# Patient Record
Sex: FEMALE | Race: WHITE | NOT HISPANIC OR LATINO | URBAN - METROPOLITAN AREA
[De-identification: names, ages, dates, MRNs, and addresses within clinical notes are randomized per-mention and may not be internally consistent; named-entity substitution may affect disease eponyms.]

---

## 2023-04-18 ENCOUNTER — OFFICE VISIT (OUTPATIENT)
Dept: ALLERGY | Facility: CLINIC | Age: 23
End: 2023-04-18
Payer: MEDICAID

## 2023-04-18 VITALS — WEIGHT: 125.81 LBS | TEMPERATURE: 98 F | HEART RATE: 89 BPM

## 2023-04-18 DIAGNOSIS — R06.89 DYSPNEA AND RESPIRATORY ABNORMALITIES: ICD-10-CM

## 2023-04-18 DIAGNOSIS — L20.84 INTRINSIC ATOPIC DERMATITIS: Primary | ICD-10-CM

## 2023-04-18 DIAGNOSIS — Z87.09 HISTORY OF ASTHMA: ICD-10-CM

## 2023-04-18 DIAGNOSIS — R06.00 DYSPNEA AND RESPIRATORY ABNORMALITIES: ICD-10-CM

## 2023-04-18 PROCEDURE — 99205 PR OFFICE/OUTPT VISIT, NEW, LEVL V, 60-74 MIN: ICD-10-PCS | Mod: S$GLB,,, | Performed by: ALLERGY & IMMUNOLOGY

## 2023-04-18 PROCEDURE — 1159F MED LIST DOCD IN RCRD: CPT | Mod: CPTII,S$GLB,, | Performed by: ALLERGY & IMMUNOLOGY

## 2023-04-18 PROCEDURE — 1160F RVW MEDS BY RX/DR IN RCRD: CPT | Mod: CPTII,S$GLB,, | Performed by: ALLERGY & IMMUNOLOGY

## 2023-04-18 PROCEDURE — 1159F PR MEDICATION LIST DOCUMENTED IN MEDICAL RECORD: ICD-10-PCS | Mod: CPTII,S$GLB,, | Performed by: ALLERGY & IMMUNOLOGY

## 2023-04-18 PROCEDURE — 99205 OFFICE O/P NEW HI 60 MIN: CPT | Mod: S$GLB,,, | Performed by: ALLERGY & IMMUNOLOGY

## 2023-04-18 PROCEDURE — 1160F PR REVIEW ALL MEDS BY PRESCRIBER/CLIN PHARMACIST DOCUMENTED: ICD-10-PCS | Mod: CPTII,S$GLB,, | Performed by: ALLERGY & IMMUNOLOGY

## 2023-04-18 RX ORDER — CLOTRIMAZOLE AND BETAMETHASONE DIPROPIONATE 10; .64 MG/G; MG/G
CREAM TOPICAL 2 TIMES DAILY
Qty: 45 G | Refills: 3 | Status: SHIPPED | OUTPATIENT
Start: 2023-04-18

## 2023-04-18 RX ORDER — FLUTICASONE PROPIONATE 50 MCG
SPRAY, SUSPENSION (ML) NASAL
COMMUNITY
Start: 2022-12-22

## 2023-04-18 RX ORDER — HYDROXYZINE PAMOATE 50 MG/1
100 CAPSULE ORAL 2 TIMES DAILY
COMMUNITY
Start: 2023-04-01

## 2023-04-18 RX ORDER — AZELASTINE 1 MG/ML
SPRAY, METERED NASAL
COMMUNITY
Start: 2022-12-28

## 2023-04-18 RX ORDER — PREDNISONE 20 MG/1
TABLET ORAL
Qty: 15 TABLET | Refills: 0 | Status: SHIPPED | OUTPATIENT
Start: 2023-04-18 | End: 2023-05-08

## 2023-04-18 RX ORDER — ALBUTEROL SULFATE 90 UG/1
2 AEROSOL, METERED RESPIRATORY (INHALATION) EVERY 6 HOURS PRN
Qty: 18 G | Refills: 0 | Status: SHIPPED | OUTPATIENT
Start: 2023-04-18 | End: 2024-04-17

## 2023-04-18 NOTE — PATIENT INSTRUCTIONS
"Biologic Tip Sheet     Please answer all 1-800 numbers as EACH month, you will need to either pay a copay or confirm shipment to an address.     Please call the office to confirm biologic shipment arrived.     If the shipment is not present, please call the specialty pharmacy FIRST for more information. We are unaware as to why shipments are not made. That decision is made by specialty pharmacies.     Again, Copay and confirmation of shipment are the biggest two reasons.     Sometimes they need clinical notes, but do not make us aware.     Dulera  is a controller medication. It works best when used routinely. You may also use this as needed for a rescue. The max amount of puffs in a 24 hour period is 12 puffs.     Take 2 puffs twice per day. Brush teeth after use.     Albuterol is a rescue medication. Use as needed every 4-6 hours for cough wheeze shortness of breath.     When asthma is flared, use 4-6 puffs every 4 hours or every 6 hours scheduled x 48 hours. After the 48 hours, you may try to extend the time interval to every 6 hours or every 8 hours scheduled until the asthma flare improves. Once the asthma flare improves, then use as needed again.      Proper technique for taking inhalers.     Shake inhaler x 10 seconds  Put inhaler in the mouth   Press the top of the inhaler until the medication comes out  Breathe in SLOWLY over 5 seconds.   Then hold breath x 10 seconds  Slowly exhale.     Repeat until the amount of puffs required to help with asthma symptoms improves.       If 6-10 puffs x 3 times used 10 minutes apart does not break the asthma "attack" go to the nearest emergency room.     Skin regimen   Use betamethasone twice per day until skin is smooth   Then use vaseline on top     Start wet to dry wraps   Wet to Dry Dressings   Wet dressings use two pairs of pajamas or sleepers.  They should be cotton and without itchy seems or tags.  The steps to apply wet dressings are below:  1) Apply the " recommended moisturizer to your childs skin.  If you are asked to use a topical steroid medicine, this should be applied only to the areas of rash.  Then, all skin that will be covered by the wet dressings should be coated with a thick layer of bland moisturizer- Vaseline.  If you are using a thick moisturizer alone, coat all skin that will be covered by the wet dressings with a thick layer of bland moisturizer-vaseline.  2) Soak one pair of your childs pajamas in warm water.  3) Wring out the pajamas until they are damp (not dripping) and put on your child.  4) Cover the damp pajamas with a pair of dry ones.  Do NOT cover with plastic.  The dampness must be allowed to evaporate.  Be sure the room is warm enough, but not hot.  The damp dressing should remain damp and not dry out.  You may cover your child in a warm blanket if he or she complains of being cold.  Your child may complain at first, but choosing a quiet, passive activity (such as playing a game, reading books, or watching a favorite show) may be helpful.   Apply thick, bland moisturizer to all skin after removing the wet dressings.  Repeat as recommended by your healthcare provider      Spirometry Instructions   Do not take albuterol (proair, ventolin, or your rescue inhaler/nebulizer) 6 hours prior to performing the test.     Do not take dulera, advair, symbicort, breo, Spiriva, or your controller inhaler 12 hrs prior to the spirometry test.      If you needed your rescue inhaler/nebulizer the morning of your test, please let someone know.     Instructions For Skin Testing    Please HOLD all antihistamines (Benadryl, zyrtec, Claritin, loratidine, cetirizine, diphenhydramine, medications with pm in the name, Allegra, fexofenadine) 7 days prior to testing.     Please HOLD zantac, ranitidine, pepsid, famotidine 3 days prior to your testing.     Please HOLD azelastine, astelin, astepro, dymista three days prior to your skin testing    Please HOLD your  Beta Blocker (ask the office if you are on one.) These medicines typically end in olol. HOLD 48 hours prior to the morning of skin testing.    Please HOLD clonidine the morning of skin testing.     Please HOLD any Tricyclic antidepressants 14 days prior to skin testing. Please consult your prescribing doctor prior to discontinuing this medication.     Please HOLD Seroquel 14 days prior to skin testing. Please consult your prescribing physician prior to stopping this medication.     After skin testing, you may resume taking your HELD medications.     You may CONTINUE Montelukast (singulair), budesonide aqua (rhinocort), budesonide respules (pulmicort) in rinses, Flonase or Fluticasone, Nasonex, Nasocrot, or any other intranasal steroid.

## 2023-04-18 NOTE — PROGRESS NOTES
"Subjective:       Patient ID: Roberto Hernandez is a 22 y.o. female.    Chief Complaint: Eczema (Patient has had eczema really since she was a child. Saw Dr. Ayala, has been skin tested. They re arranged their life styles for patient and quality of life improved. With in the last year, she cant get eczema under control. Taking hydroxizine, claritin. )    HPI    Pt presents as a new patient   To discuss dupixent as a recommendation by her ENT, Dr. Stephenson.     He recommended a rhinitis regimen of ins and oral H1 and oral pred to treat eczematous type rash.   She has a history of atopic derm in childhood.     However, now, her atopic derm is close to almost all surfaces of her body.     She was skin tested when she was younger.     She went to urgent care that gave oral steroids, but , her skin is still exacerbated.    Unknown triggers.   She is putting eucerin cream.     She tried a cream from urgent care that gave her an "m" cream     She doesn't have refills for anything currently.     History of asthma  Has sx > 2 days per week   She has an old albuterol inhaler   Sometimes albuteorl helps  Doesn't have a controller      Last time she was skin prick tested she as around 6 years of age.               Review of Systems    General: neg unexpected weight changes, fevers, chills, night sweats, malaise  HEENT: see hpi, Neg eye pain, vision changes, ear drainage, nose bleeds, throat tightness, sores in the mouth  CV: Neg chest pain, palpitations, swelling  Resp: see hpi, neg shortness of breath, hemoptysis, cough  GI: see hpi, neg dysphagia, night abdominal pain, reflux, chronic diarrhea, chronic constipation  Derm: See Hpi, neg new rash, neg flushing  Mu/sk: Neg joint pain, joint swelling   Psych: Neg anxiety  neuro: neg chronic headaches, muscle weakness  Endo: neg heat/cold intolerance, chronic fatigue    Objective:     Vitals:    04/18/23 1518   Pulse: 89   Temp: 98.2 °F (36.8 °C)   Weight: 57.1 kg (125 lb 12.8 oz)      "   Physical Exam    General: no acute distress, well developed well nourished   Chest: full respiratory excursion no abnormal chest abnormality  Resp: clear to ascultation bilaterally  CV: RRR, neg MRG, brisk capillary refill  Ext:  Neg clubbing, cyanosis, pitting edema  Skin: multiple excoriated lesions with atopic plaques located on face, bilateral anterior and posterior upper and lower extremities, chest, back , ankle feet.     Assessment:       1. Intrinsic atopic dermatitis    2. History of asthma    3. Dyspnea and respiratory abnormalities        Plan:       Intrinsic atopic dermatitis  -     clotrimazole-betamethasone 1-0.05% (LOTRISONE) cream; Apply topically 2 (two) times daily.  Dispense: 45 g; Refill: 3  -     predniSONE (DELTASONE) 20 MG tablet; Take 1 tablet (20 mg total) by mouth once daily for 5 days, THEN 0.5 tablets (10 mg total) once daily for 5 days, THEN 0.5 tablets (10 mg total) every other day for 10 days.  Dispense: 15 tablet; Refill: 0    History of asthma  -     mometasone-formoterol (DULERA) 200-5 mcg/actuation inhaler; Inhale 2 puffs into the lungs 2 (two) times daily. Controller  Dispense: 13 g; Refill: 3  -     Spirometry with/without bronchodilator; Future  -     predniSONE (DELTASONE) 20 MG tablet; Take 1 tablet (20 mg total) by mouth once daily for 5 days, THEN 0.5 tablets (10 mg total) once daily for 5 days, THEN 0.5 tablets (10 mg total) every other day for 10 days.  Dispense: 15 tablet; Refill: 0    Dyspnea and respiratory abnormalities  -     albuterol (VENTOLIN HFA) 90 mcg/actuation inhaler; Inhale 2 puffs into the lungs every 6 (six) hours as needed for Wheezing. Rescue  Dispense: 18 g; Refill: 0  -     mometasone-formoterol (DULERA) 200-5 mcg/actuation inhaler; Inhale 2 puffs into the lungs 2 (two) times daily. Controller  Dispense: 13 g; Refill: 3  -     Spirometry with/without bronchodilator; Future            Atopic dermatitis  4/23:  Start dupixent 300 mg q 2 weeks  Her BSA  covered is around 95%   SCORAD is 75   Oral steroid taper   Start betamethasone-clotrimazole bid   Start vaseline   Start wet to dry wraps     History of asthma   4/23:  Start dulera 200 mcg 2 puffs twice per day   Start albuteorl  Action plan given     Skin prick test inhalant panel whol  instruction given     Follow up in 6 weeks, sooner if needed      Susanna Callahan M.D.  Allergy/Immunology  [facility]   [phone] phone  [phone] fax

## 2023-08-10 ENCOUNTER — CLINICAL SUPPORT (OUTPATIENT)
Dept: ALLERGY | Facility: CLINIC | Age: 23
End: 2023-08-10
Payer: MEDICAID

## 2023-08-10 VITALS — WEIGHT: 125 LBS | TEMPERATURE: 98 F | OXYGEN SATURATION: 99 %

## 2023-08-10 DIAGNOSIS — J31.0 CHRONIC RHINITIS: Primary | ICD-10-CM

## 2023-08-10 PROCEDURE — 95004 PERQ TESTS W/ALRGNC XTRCS: CPT | Mod: S$GLB,,, | Performed by: ALLERGY & IMMUNOLOGY

## 2023-08-10 PROCEDURE — 95004 PR ALLERGY SKIN TESTS,ALLERGENS: ICD-10-PCS | Mod: S$GLB,,, | Performed by: ALLERGY & IMMUNOLOGY

## 2023-09-14 ENCOUNTER — DOCUMENTATION ONLY (OUTPATIENT)
Dept: ALLERGY | Facility: CLINIC | Age: 23
End: 2023-09-14

## 2023-09-14 DIAGNOSIS — Z91.199 NO-SHOW FOR APPOINTMENT: Primary | ICD-10-CM

## 2024-02-22 DIAGNOSIS — N92.5 OTHER SPECIFIED IRREGULAR MENSTRUATION: ICD-10-CM

## 2024-02-22 DIAGNOSIS — E28.2 POLYCYSTIC OVARIES: ICD-10-CM

## 2024-02-22 DIAGNOSIS — Q51.818: Primary | ICD-10-CM

## 2024-03-14 DIAGNOSIS — E28.2 POLYCYSTIC OVARIES: ICD-10-CM

## 2024-03-14 DIAGNOSIS — N92.5 RETROGRADE MENSTRUATION: ICD-10-CM

## 2024-03-14 DIAGNOSIS — Q51.818: Primary | ICD-10-CM

## 2024-04-23 ENCOUNTER — HOSPITAL ENCOUNTER (OUTPATIENT)
Dept: RADIOLOGY | Facility: HOSPITAL | Age: 24
Discharge: HOME | End: 2024-04-23
Payer: MEDICAID

## 2024-04-23 DIAGNOSIS — N92.5 RETROGRADE MENSTRUATION: ICD-10-CM

## 2024-04-23 DIAGNOSIS — E28.2 POLYCYSTIC OVARIES: ICD-10-CM

## 2024-04-23 DIAGNOSIS — Q51.818: ICD-10-CM

## 2024-04-23 PROCEDURE — 74177 CT ABD & PELVIS W/CONTRAST: CPT | Mod: TC

## 2024-04-23 PROCEDURE — 25500020 PHARM REV CODE 255

## 2024-04-23 PROCEDURE — 74177 CT ABD & PELVIS W/CONTRAST: CPT | Mod: 26,,, | Performed by: RADIOLOGY

## 2024-04-23 PROCEDURE — A9698 NON-RAD CONTRAST MATERIALNOC: HCPCS

## 2024-04-23 RX ADMIN — IOHEXOL 1000 ML: 9 SOLUTION ORAL at 03:04

## 2024-04-23 RX ADMIN — IOHEXOL 75 ML: 350 INJECTION, SOLUTION INTRAVENOUS at 03:04
